# Patient Record
Sex: MALE | Race: WHITE | Employment: FULL TIME | URBAN - METROPOLITAN AREA
[De-identification: names, ages, dates, MRNs, and addresses within clinical notes are randomized per-mention and may not be internally consistent; named-entity substitution may affect disease eponyms.]

---

## 2021-04-05 ENCOUNTER — OFFICE VISIT (OUTPATIENT)
Dept: ORTHOPEDIC SURGERY | Age: 61
End: 2021-04-05
Payer: COMMERCIAL

## 2021-04-05 VITALS — RESPIRATION RATE: 14 BRPM | BODY MASS INDEX: 33.34 KG/M2 | HEIGHT: 68 IN | WEIGHT: 220 LBS

## 2021-04-05 DIAGNOSIS — M54.12 CERVICAL RADICULOPATHY: Primary | ICD-10-CM

## 2021-04-05 PROCEDURE — 99203 OFFICE O/P NEW LOW 30 MIN: CPT | Performed by: PHYSICIAN ASSISTANT

## 2021-04-05 PROCEDURE — 3017F COLORECTAL CA SCREEN DOC REV: CPT | Performed by: PHYSICIAN ASSISTANT

## 2021-04-05 PROCEDURE — 1036F TOBACCO NON-USER: CPT | Performed by: PHYSICIAN ASSISTANT

## 2021-04-05 PROCEDURE — G8417 CALC BMI ABV UP PARAM F/U: HCPCS | Performed by: PHYSICIAN ASSISTANT

## 2021-04-05 PROCEDURE — G8427 DOCREV CUR MEDS BY ELIG CLIN: HCPCS | Performed by: PHYSICIAN ASSISTANT

## 2021-04-05 RX ORDER — PREDNISONE 20 MG/1
20 TABLET ORAL 2 TIMES DAILY
Qty: 14 TABLET | Refills: 0 | Status: SHIPPED | OUTPATIENT
Start: 2021-04-05 | End: 2021-04-12

## 2021-04-05 ASSESSMENT — ENCOUNTER SYMPTOMS
GASTROINTESTINAL NEGATIVE: 1
RESPIRATORY NEGATIVE: 1
EYES NEGATIVE: 1

## 2021-04-05 NOTE — PROGRESS NOTES
I reviewed and agree with the portions of the HPI, review of systems, vital documentation and plan performed by my staff and have added/addended where appropriate. Review of Systems   Constitutional: Negative. HENT: Negative. Eyes: Negative. Respiratory: Negative. Cardiovascular: Negative. Gastrointestinal: Negative. Genitourinary: Negative. Musculoskeletal: Positive for arthralgias, myalgias, neck pain and neck stiffness. Skin: Negative. Neurological: Negative. Psychiatric/Behavioral: Negative. Kori Massey is a 62 y.o. male that is here today for his right shoulder pain. He states that approximately 2-3 months ago he shoved a cart with his shoulder while loading his truck and felt a sharp pain. Pain ic primarily located in the scapula and posterior shoulder and in the neck. . He has been seeing a chiropractor and a massage therapist which little relief. Pain level 10/10. He has tried ice, heat, Aleve, Tylenol which with no help. The chiropractor that he has seen in the past felt that it was likely his cervical spine but he ended up going to another chiropractor and that chiropractor suggested he come here to get his shoulder evaluated first before any further treatment. He states that pain is worse when he goes to extend his neck looking up towards the ceiling or when he brings his neck and chin down to his chest.  Pain sometimes does radiate down the right arm especially the posterior aspect of the arm. He does not notice any significant increase in pain with range of motion of the shoulder. He does not notice any weakness in the right shoulder. History reviewed. No pertinent past medical history. History reviewed. No pertinent surgical history. History reviewed. No pertinent family history.     Social History     Socioeconomic History    Marital status:      Spouse name: None    Number of children: None    Years of education: None    Highest education level: None   Occupational History    None   Social Needs    Financial resource strain: None    Food insecurity     Worry: None     Inability: None    Transportation needs     Medical: None     Non-medical: None   Tobacco Use    Smoking status: Former Smoker     Quit date: 9/16/2010     Years since quitting: 10.5    Smokeless tobacco: Never Used   Substance and Sexual Activity    Alcohol use: No    Drug use: No    Sexual activity: None   Lifestyle    Physical activity     Days per week: None     Minutes per session: None    Stress: None   Relationships    Social connections     Talks on phone: None     Gets together: None     Attends Islam service: None     Active member of club or organization: None     Attends meetings of clubs or organizations: None     Relationship status: None    Intimate partner violence     Fear of current or ex partner: None     Emotionally abused: None     Physically abused: None     Forced sexual activity: None   Other Topics Concern    None   Social History Narrative    None       Current Outpatient Medications   Medication Sig Dispense Refill    predniSONE (DELTASONE) 20 MG tablet Take 1 tablet by mouth 2 times daily for 7 days 14 tablet 0     No current facility-administered medications for this visit. No Known Allergies    Review of Systems:  See above      Physical Exam:   Resp 14   Ht 5' 8\" (1.727 m)   Wt 220 lb (99.8 kg)   BMI 33.45 kg/m²        Gait is Normal.     Gen/Psych:Examination reveals a pleasant individual in no acute distress. The patient is oriented to time, place and person. The patient's mood and affect are appropriate. Patient appears well nourished.  Body habitus is overweight    Head: Head is atraumatic normocephalic,  ears are symmetric,     Eyes: Eyes show equal pupils bilaterally, extraocular muscles intact    Ears:  Hearing is intact normal voice at 5 feet    Nose: Nares are patent bilaterally, no epistaxis,no rhinorrhea     Lymph:  No obvious lymphedema in bilateral upper extremities     Skin intact in bilateral upper extremities with no ulcerations, lesions, rash, erythema. Vascular: There are no varicosities in bilateral upper  extremities, sensation intact to light touch over bilateral upper extremities. Right shoulder exam:  Skin:  Clear with no erythema, there is no significant joint effusion  Deformity:  none  Atrophy:  none  Tenderness:  scapula, posterior cervical triangle, mild globally  Active ROM:   FE:180    IR side: L2           ER side: 50   Abduction: 180 degrees  Passive ROM is the same as active  Strength: FE:5/5     ER:5/5 IR:5/5      Elbow flexion: 5/5  [place]:  not positive  [place]:  not positive  [place]:not positive  [place]:  not positive        Cervical spine:  Tenderness to palpation along the medial scapular border of the right shoulder, tenderness over the mid line cervical spine. Range of motion of the cervical spine shows moderate reduction in extension and mild reduction in lateral bending to the right. No significant reduction and lateral rotation to the left or the right. Imaging studies:  4 views of the right shoulder taken and reviewed in the office today show no significant degenerative change in the glenohumeral joint, no degenerative changes in the acromioclavicular joint. There are no fractures or dislocations within the right shoulder. Incidental note of cervical degenerative changes noted. The official read and interpretation of these x-rays will be done by the the [facility]           Impression:    Diagnosis Orders   1. Cervical radiculopathy  MRI CERVICAL SPINE WO CONTRAST           Plan:    MRI of the cervical spine ordered to evaluate for cervical nerve compression based on patient's physical exam and history today I believe more of his pain is coming from his cervical spine.   I will see him back after the cervical MRI and depending on

## 2021-04-15 ENCOUNTER — HOSPITAL ENCOUNTER (OUTPATIENT)
Dept: MRI IMAGING | Age: 61
Discharge: HOME | End: 2021-04-15
Payer: COMMERCIAL

## 2021-04-15 ENCOUNTER — TELEPHONE (OUTPATIENT)
Dept: ORTHOPEDIC SURGERY | Age: 61
End: 2021-04-15

## 2021-04-15 DIAGNOSIS — M54.12 CERVICAL RADICULOPATHY: ICD-10-CM

## 2021-04-15 NOTE — TELEPHONE ENCOUNTER
Pt went to have MRI of neck and shoulder, he could not finish it, he had a panic attack. What would you like him to do now? ?  Stopped in the office.     Please advise  [phone]

## 2021-04-19 ENCOUNTER — OFFICE VISIT (OUTPATIENT)
Dept: ORTHOPEDIC SURGERY | Age: 61
End: 2021-04-19
Payer: COMMERCIAL

## 2021-04-19 VITALS
BODY MASS INDEX: 33.34 KG/M2 | RESPIRATION RATE: 16 BRPM | WEIGHT: 220 LBS | HEART RATE: 72 BPM | HEIGHT: 68 IN | OXYGEN SATURATION: 97 %

## 2021-04-19 DIAGNOSIS — M54.12 CERVICAL RADICULOPATHY: Primary | ICD-10-CM

## 2021-04-19 PROCEDURE — G8427 DOCREV CUR MEDS BY ELIG CLIN: HCPCS | Performed by: PHYSICIAN ASSISTANT

## 2021-04-19 PROCEDURE — 1036F TOBACCO NON-USER: CPT | Performed by: PHYSICIAN ASSISTANT

## 2021-04-19 PROCEDURE — G8417 CALC BMI ABV UP PARAM F/U: HCPCS | Performed by: PHYSICIAN ASSISTANT

## 2021-04-19 PROCEDURE — 3017F COLORECTAL CA SCREEN DOC REV: CPT | Performed by: PHYSICIAN ASSISTANT

## 2021-04-19 PROCEDURE — 99212 OFFICE O/P EST SF 10 MIN: CPT | Performed by: PHYSICIAN ASSISTANT

## 2021-04-19 RX ORDER — ESCITALOPRAM OXALATE 10 MG/1
TABLET ORAL
COMMUNITY
Start: 2021-04-16

## 2021-04-19 RX ORDER — HYDROCODONE BITARTRATE AND ACETAMINOPHEN 5; 325 MG/1; MG/1
1 TABLET ORAL 2 TIMES DAILY PRN
Qty: 6 TABLET | Refills: 0 | Status: SHIPPED | OUTPATIENT
Start: 2021-04-19 | End: 2021-04-22

## 2021-04-19 RX ORDER — CYCLOBENZAPRINE HCL 10 MG
TABLET ORAL
COMMUNITY
Start: 2021-04-16

## 2021-04-19 RX ORDER — LORAZEPAM 0.5 MG/1
0.5 TABLET ORAL ONCE
Qty: 1 TABLET | Refills: 0 | Status: CANCELLED | OUTPATIENT
Start: 2021-04-19 | End: 2021-04-19

## 2021-04-19 NOTE — PATIENT INSTRUCTIONS
Once MRI is completed bring a disc into the office. MRI of Cervical Spine  Follow-up in 2-3 weeks to go over MRI. Weightbearing as tolerated  Take pain medication prior to procedure. Make sure you have someone to drive you home after procedure.

## 2021-04-19 NOTE — TELEPHONE ENCOUNTER
Kelsey Josue PA-C  You 23 minutes ago (12:48 PM)     Ativan . 5mg   Take 45 minutes prior to procedure

## 2021-04-21 ENCOUNTER — TELEPHONE (OUTPATIENT)
Dept: ORTHOPEDIC SURGERY | Age: 61
End: 2021-04-21

## 2021-04-21 NOTE — TELEPHONE ENCOUNTER
Per Francine Case pt is to take pain medication to make the MRI tolerable. I called wife and informed her to use the pain medication as indicated to make the MRI tolerable.

## 2021-04-21 NOTE — TELEPHONE ENCOUNTER
Wife called stating that pt needs a medication for anxiety. The wife states that she just picked up the Rx for pain.      Pharmacy [facility]

## 2021-04-26 ENCOUNTER — TELEPHONE (OUTPATIENT)
Dept: ORTHOPEDIC SURGERY | Age: 61
End: 2021-04-26

## 2021-04-26 DIAGNOSIS — M25.511 RIGHT SHOULDER PAIN, UNSPECIFIED CHRONICITY: ICD-10-CM

## 2021-04-26 DIAGNOSIS — M54.12 CERVICAL RADICULOPATHY: Primary | ICD-10-CM

## 2021-04-26 NOTE — TELEPHONE ENCOUNTER
Pt went today for a open MRI of his neck/shoulder. We could not complete it, he about passed out. The tech said maybe he could have a CT.     Please advise   [phone]  Wife Elvia Griffiths

## 2021-04-29 NOTE — TELEPHONE ENCOUNTER
Patient is not capable of completing MRI secondary to pain medication from you and anxiety medication from PCP. Patients wife called on behalf of the patient to see what the next step should be. She reports the Prednisone you prescribed did not help his symptoms. Please advise.

## 2021-05-03 NOTE — TELEPHONE ENCOUNTER
Per Johny Morris  Patient is going to need follow up with PCP or follow up with Dr. Sacha Mares      9:50 Patient was called and message was left.  Patient needs to follow up with PCP or Dr. Sacha Mares

## 2021-05-13 ENCOUNTER — HOSPITAL ENCOUNTER (OUTPATIENT)
Age: 61
Discharge: HOME | End: 2021-05-13
Payer: COMMERCIAL

## 2021-05-13 ENCOUNTER — HOSPITAL ENCOUNTER (OUTPATIENT)
Dept: GENERAL RADIOLOGY | Age: 61
Discharge: HOME | End: 2021-05-13
Payer: COMMERCIAL

## 2021-05-13 DIAGNOSIS — M47.22 CERVICAL SPONDYLOSIS WITH RADICULOPATHY: ICD-10-CM

## 2021-05-13 PROCEDURE — 72040 X-RAY EXAM NECK SPINE 2-3 VW: CPT

## 2021-06-01 ENCOUNTER — HOSPITAL ENCOUNTER (OUTPATIENT)
Dept: PHYSICAL THERAPY | Age: 61
Setting detail: THERAPIES SERIES
Discharge: HOME | End: 2021-06-01
Payer: COMMERCIAL

## 2021-06-01 PROCEDURE — 97110 THERAPEUTIC EXERCISES: CPT

## 2021-06-01 PROCEDURE — 97140 MANUAL THERAPY 1/> REGIONS: CPT

## 2021-06-01 PROCEDURE — 97162 PT EVAL MOD COMPLEX 30 MIN: CPT

## 2021-06-01 ASSESSMENT — PAIN SCALES - GENERAL: PAINLEVEL_OUTOF10: 8

## 2021-06-01 NOTE — FLOWSHEET NOTE
Outpatient Physical Therapy  [place]           [x] Phone: [phone]   Fax: [phone]  [facility]           [] Phone: [phone]   Fax: [phone]        Physical Therapy Daily Treatment Note  Date:  2021    Patient Name:  Edi Fermin    :  1960  MRN: [id number]  Restrictions/Precautions:    Diagnosis:   Diagnosis: Cervical Radiculopathy  Date of Injury/Surgery:   Treatment Diagnosis: Treatment Diagnosis: Cervical Radiculopathy    Insurance/Certification information: PT Insurance Information: [facility]   Referring Physician:  Referring Practitioner: Dr. Orozco Given  Next Doctor Visit:    Plan of care signed (Y/N):    Outcome Measure:   Visit# / total visits:   /up to 10 initially  Pain level: 8/10   Goals:     Patient goals : decrease pain     Long term goals  Time Frame for Long term goals : 5 weeks  Long term goal 1: I in home program.  Long term goal 2: turn head, look over shoulders with minimal pain. Long term goal 3: look up with minimal pain. Long term goal 4: decrease UE, scapular symptoms by 75% or more. Summary of Evaluation: Assessment: Pt presents with complaints of neck, upper back, scapula, and R shoulder/arm symptoms onset ~4months ago when lifting/moving a large flower pot from an over head shelf hit him on the R superior shoulder/clavicle area. Most pain is R low cervical/upper back and medial border of scapula, with some pain at R arm. Symptoms worsen with cervical extension. He states MRI was attempted x2, but he could not complete due to pain and anxiety. Exam shows loss of cervical motion, forward head posture, intact dermatomes to lt touch, myotomes intact, + with R quadrant, R spurlings, no change with cervical distraction, + for relief with R UE abduction relief sign. Subjective:  See eval         Any changes in Ambulatory Summary Sheet?   None        Objective:  See eval   COVID screening questions were asked and patient attested that there had Cold/hotpack [] Iontophoresis   [x] Instruction in HEP      [] Vasopneumatic   [] Dry Needling    [x] Manual Therapy               [] Aquatic Therapy              Electronically signed by:  Tarun Choudhary PT, 6/2/2021, 7:44 AM

## 2021-06-01 NOTE — PLAN OF CARE
Outpatient Physical Therapy                  [x] Phone: [phone]   Fax: [phone]    Pediatric Therapy                                    [] Phone: [phone]   Fax: [phone]  Pediatric [facility]                                      [] Phone: [phone]   Fax: [phone]      To: Referring Practitioner: Dr. Danielle Osborne    From: Lazaro Queen, PT, PT     Patient: Amelia Bower       : 1960  Diagnosis: Cervical Radiculopathy       Date: 2021    Physical Therapy Certification/Re-Certification Form  Dear Dr. Danielle Osborne  The following patient has been evaluated for physical therapy services and for therapy to continue, Please review the attached evaluation and/or summary of the patient's plan of care, and verify that you agree therapy should continue by signing the attached document and sending it back to our office.   Patient is a  63 yo male who presents with cervical, R shoulder, upper back pain which impacts on adls;patient's goal is to decrease pain ;patient reports that pain  limits activities including turning head, sleep, looking up, sustained postures; PT to address patient's goals, impairments and activity limitations with skilled interventions checked in plan of care;patient's level of function prior to onset of pain was independent without pain limiting activities; did not observe any barriers to learning during PT eval; learning preferences include demonstration, practice, and handouts; patient expressed understanding of HEP; patient appears to be motivated to participate in an active PT program and to be compliant with HEP expectations;patient assisted in developing treatment plan and goals; no DME is currently being used;      Current functional level (based on NDI )   : 40%  Assessment:  Assessment: Pt presents with complaints of neck, upper back, scapula, and R shoulder/arm symptoms onset ~4months ago when lifting/moving a large flower pot from an over head shelf hit him on the R superior shoulder/clavicle area. Most pain is R low cervical/upper back and medial border of scapula, with some pain at R arm. Symptoms worsen with cervical extension. He states MRI was attempted x2, but he could not complete due to pain and anxiety. Exam shows loss of cervical motion, forward head posture, intact dermatomes to lt touch, myotomes intact, + with R quadrant, R spurlings, no change with cervical distraction, + for relief with R UE abduction relief sign. Plan of Care/Treatment to date:  [x] Therapeutic Exercise    [] Aquatics:  [x] Therapeutic Activity    [] Ultrasound  [] Elec Stimulation  [] Gait Training     [x] Cervical Traction [] Lumbar Traction  [x] Neuromuscular Re-education [] Cold/hotpack [] Iontophoresis   [x] Instruction in HEP       [x] Manual Therapy     [] vasopneumatic            [] Self care home management        []Dry needling trigger point point/pain management          Frequency/Duration:  # Days per week: [] 1 day # Weeks: [] 1 week [x] 5 weeks     [x] 2 days   [] 2 weeks [] 6 weeks     [] 3 days   [] 3 weeks [] 7 weeks     [] 4 days   [] 4 weeks [] 8 weeks    Rehab Potential/Progress: [] Excellent [x] Good [x] Fair  [] Poor     Goals:       Long term goals  Time Frame for Long term goals : 5 weeks  Long term goal 1: I in home program.  Long term goal 2: turn head, look over shoulders with minimal pain. Long term goal 3: look up with minimal pain. Long term goal 4: decrease UE, scapular symptoms by 75% or more. Electronically signed by:  Rosio Dawkins PT, PT, 6/1/2021, 5:59 PM              If you have any questions or concerns, please don't hesitate to call.   Thank you for your referral.      Physician Signature:_________________Date:____________Time: ________  By signing above, therapists plan is approved by physician

## 2021-06-01 NOTE — PROGRESS NOTES
Physical Therapy  Initial Assessment  Date: 2021  Patient Name: Rabia Piper  MRN: [id number]  : 1960     Treatment Diagnosis: Cervical Radiculopathy    Restrictions       Subjective   General  Additional Pertinent Hx: Neck and RUE pain, 4months onset when lifting shelving, and also a pot fell and landed on R collarbone. Referring Practitioner: Dr. Cruz Patient  Referral Date : 21  Diagnosis: Cervical Radiculopathy  PT Visit Information  PT Insurance Information: [facility]  Subjective  Subjective: R neck, upper back, scap pain. sharp pains at shoulder. 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 Observation: forward head    AROM RUE (degrees)  RUE AROM : WFL  AROM LUE (degrees)  LUE AROM : WFL  Spine  Cervical: 45 flex, ext (pain w ext, to R medial scap), 20 RSB, 35 LSB, Rrot with mild tightness. B rotation WFLs. Special Tests: + R quadrant, pain R spurlings. , no relief with distraction, relief with R hand on scap    Strength RUE  Comment: myotomes intact  Strength LUE  Comment: myotomes intact  Strength Other  Other: mild pain with shoulder impingement signs, neg for hug, neg AC shear.         Sensation  Overall Sensation Status:  (dermatomes intact to lt touch BUE)               Assessment   Conditions Requiring Skilled Therapeutic Intervention  Assessment: Pt presents with complaints of neck, upper back, scapula, and R shoulder/arm symptoms onset ~4months ago when lifting/moving a large flower pot from an over head shelf hit him on the R superior shoulder/clavicle area. Most pain is R low cervical/upper back and medial border of scapula, with some pain at R arm. Symptoms worsen with cervical extension. He states MRI was attempted x2, but he could not complete due to pain and anxiety. Exam shows loss of cervical motion, forward head posture, intact dermatomes to lt touch, myotomes intact, + with R quadrant, R spurlings, no change with cervical distraction, + for relief with R UE abduction relief sign. Treatment Diagnosis: Cervical Radiculopathy  Prognosis: Good;Fair         Goals  Long term goals  Time Frame for Long term goals : 5 weeks  Long term goal 1: I in home program.  Long term goal 2: turn head, look over shoulders with minimal pain. Long term goal 3: look up with minimal pain. Long term goal 4: decrease UE, scapular symptoms by 75% or more.   Patient Goals   Patient goals : decrease pain          Jordyn Zheng PT

## 2021-06-09 ENCOUNTER — HOSPITAL ENCOUNTER (OUTPATIENT)
Dept: PHYSICAL THERAPY | Age: 61
Setting detail: THERAPIES SERIES
Discharge: HOME | End: 2021-06-09
Payer: COMMERCIAL

## 2021-06-09 PROCEDURE — 97110 THERAPEUTIC EXERCISES: CPT

## 2021-06-09 PROCEDURE — 97140 MANUAL THERAPY 1/> REGIONS: CPT

## 2021-06-09 NOTE — FLOWSHEET NOTE
Outpatient Physical Therapy  [place]           [x] Phone: [phone]   Fax: [phone]  [place]           [] Phone: [phone]   Fax: [phone]        Physical Therapy Daily Treatment Note  Date:  2021    Patient Name:  Tobias Fothergill    :  1960  MRN: [id number]  Restrictions/Precautions:    Diagnosis:   Diagnosis: Cervical Radiculopathy  Date of Injury/Surgery:   Treatment Diagnosis: Treatment Diagnosis: Cervical Radiculopathy    Insurance/Certification information: PT Insurance Information: [facility]   Referring Physician:  Referring Practitioner: Dr. Roni Hitchcock  Next Doctor Visit:    Plan of care signed (Y/N):    Outcome Measure:   Visit# / total visits:  2 /up to 10 initially  Pain level: 4/10   Goals:     Patient goals : decrease pain     Long term goals  Time Frame for Long term goals : 5 weeks  Long term goal 1: I in home program.  Long term goal 2: turn head, look over shoulders with minimal pain. Long term goal 3: look up with minimal pain. Long term goal 4: decrease UE, scapular symptoms by 75% or more. Summary of Evaluation: Assessment: Pt presents with complaints of neck, upper back, scapula, and R shoulder/arm symptoms onset ~4months ago when lifting/moving a large flower pot from an over head shelf hit him on the R superior shoulder/clavicle area. Most pain is R low cervical/upper back and medial border of scapula, with some pain at R arm. Symptoms worsen with cervical extension. He states MRI was attempted x2, but he could not complete due to pain and anxiety. Exam shows loss of cervical motion, forward head posture, intact dermatomes to lt touch, myotomes intact, + with R quadrant, R spurlings, no change with cervical distraction, + for relief with R UE abduction relief sign. Subjective:  Pt relates some sticking pain at R upper back and shoulder. Better overall. Any changes in Ambulatory Summary Sheet?   None        Objective:    COVID screening questions were asked and patient attested that there had been no contact or symptoms    Pt has difficulty relaxing. Increased tone at upper trap, levator. cervical paraspinals. BSB 35 degs    Exercises: (No more than 4 columns)   Exercise/Equipment Date 6/1/21 Date 6/9/21  Date           WARM UP                     TABLE      Upper back stretch 20 sec x 2 review    Scalene stretch 20 sex x2 review    Levator stretch RUE reach to scap 20 sec x 3 review    Chin tuck               STANDING      pec stretch  Corner 20 sec x 3    Rows  Blue 3x10    Habd                                   PROPRIOCEPTION                                    MODALITIES      May trial mech traction if manual is tolerated                Other Therapeutic Activities/Education:        Home Exercise Program:  Upper back, levator, scalene stretch  Rows, corner stretch    Manual Treatments:  Prolonged manual cervical traction with OA release, TPR/MFR to Upper trap, levator, scalenes, pectorals, rhomboids. Modalities:        Communication with other providers:        Assessment:  (Response towards treatment session) (Pain Rating)  Decreased pain overall, improved cervical motion. May need to review home program again next visit. Pt would benefit from additional therapy to improve posture, decrease symptoms and improve functions.           Plan for Next Session:        Time In / Time Out:    1658/1740           Timed Code/Total Treatment Minutes:  42/42      Next Progress Note due:        Plan of Care Interventions:  [x] Therapeutic Exercise  [] Modalities:  [x] Therapeutic Activity     [] Ultrasound  [] Estim  [] Gait Training      [] Cervical Traction [] Lumbar Traction  [x] Neuromuscular Re-education    [] Cold/hotpack [] Iontophoresis   [x] Instruction in HEP      [] Vasopneumatic   [] Dry Needling    [x] Manual Therapy               [] Aquatic Therapy              Electronically signed by:  Nicole Atwood PT, 6/9/2021, 4:57 PM

## 2021-06-11 ENCOUNTER — HOSPITAL ENCOUNTER (OUTPATIENT)
Dept: PHYSICAL THERAPY | Age: 61
Setting detail: THERAPIES SERIES
Discharge: HOME | End: 2021-06-11
Payer: COMMERCIAL

## 2021-06-11 PROCEDURE — 97140 MANUAL THERAPY 1/> REGIONS: CPT

## 2021-06-11 PROCEDURE — 97110 THERAPEUTIC EXERCISES: CPT

## 2021-06-11 NOTE — FLOWSHEET NOTE
Outpatient Physical Therapy  [place]           [x] Phone: [phone]   Fax: [phone]  [place]           [] Phone: [phone]   Fax: [phone]        Physical Therapy Daily Treatment Note  Date:  2021    Patient Name:  Juan Reynaga    :  1960  MRN: [id number]  Restrictions/Precautions:    Diagnosis:   Diagnosis: Cervical Radiculopathy  Date of Injury/Surgery:   Treatment Diagnosis: Treatment Diagnosis: Cervical Radiculopathy    Insurance/Certification information: PT Insurance Information: [facility]   Referring Physician:  Referring Practitioner: Dr. Marianela Garsia  Next Doctor Visit:    Plan of care signed (Y/N):    Outcome Measure:   Visit# / total visits:  3 /up to 10 initially  Pain level: 5/10    Goals:     Patient goals : decrease pain     Long term goals  Time Frame for Long term goals : 5 weeks  Long term goal 1: I in home program.  Long term goal 2: turn head, look over shoulders with minimal pain. Long term goal 3: look up with minimal pain. Long term goal 4: decrease UE, scapular symptoms by 75% or more. Summary of Evaluation: Assessment: Pt presents with complaints of neck, upper back, scapula, and R shoulder/arm symptoms onset ~4months ago when lifting/moving a large flower pot from an over head shelf hit him on the R superior shoulder/clavicle area. Most pain is R low cervical/upper back and medial border of scapula, with some pain at R arm. Symptoms worsen with cervical extension. He states MRI was attempted x2, but he could not complete due to pain and anxiety. Exam shows loss of cervical motion, forward head posture, intact dermatomes to lt touch, myotomes intact, + with R quadrant, R spurlings, no change with cervical distraction, + for relief with R UE abduction relief sign. Subjective:   Pt arrives to tx session reporting 5/10 and in good spirits. Any changes in Ambulatory Summary Sheet?   None        Objective:    COVID screening questions were asked and patient attested that there had been no contact or symptoms    Pt has difficulty relaxing. Increased tone at upper trap, levator. cervical paraspinals. Pt C/O muscle knot pain lateral R scap area. Responds very well to manual TPR. Exercises: (No more than 4 columns)   Exercise/Equipment Date 6/1/21 Date 6/9/21  Date  6/11/21 #3           WARM UP                     TABLE      Upper back stretch 20 sec x 2 review review   Scalene stretch 20 sex x2 review review   Levator stretch RUE reach to scap 20 sec x 3 review review   Chin tuck   x10            STANDING      pec stretch  Corner 20 sec x 3 Corner 20 sec x 3   ext   Blue 3x10   Rows  Blue 3x10 Blue 3x10   Habd                                   PROPRIOCEPTION                                    MODALITIES      May trial mech traction if manual is tolerated                Other Therapeutic Activities/Education:        Home Exercise Program:  Upper back, levator, scalene stretch  Rows, corner stretch    Manual Treatments:  Prolonged manual cervical traction with OA release, TPR/MFR to Upper trap, levator, scalenes, pectorals, rhomboids. Modalities:        Communication with other providers:        Assessment:  Pt tolerates session well today without adverse effects. Reports pain decrease to 2/10 after session. Pt would benefit from additional therapy to improve posture, decrease symptoms and improve functions.           Plan for Next Session:        Time In / Time Out:     1871 / 4311           Timed Code/Total Treatment Minutes:  36' / 36'    2 man    1 TE      Next Progress Note due:        Plan of Care Interventions:  [x] Therapeutic Exercise  [] Modalities:  [x] Therapeutic Activity     [] Ultrasound  [] Estim  [] Gait Training      [] Cervical Traction [] Lumbar Traction  [x] Neuromuscular Re-education    [] Cold/hotpack [] Iontophoresis   [x] Instruction in HEP      [] Vasopneumatic   [] Dry Needling    [x] Manual Therapy               [] Aquatic Therapy              Electronically signed by:  Marvin Connors PTA, 6/11/2021, 1:28 PM

## 2021-06-14 ENCOUNTER — HOSPITAL ENCOUNTER (OUTPATIENT)
Dept: PHYSICAL THERAPY | Age: 61
Setting detail: THERAPIES SERIES
Discharge: HOME | End: 2021-06-14
Payer: COMMERCIAL

## 2021-06-14 PROCEDURE — 97110 THERAPEUTIC EXERCISES: CPT

## 2021-06-14 PROCEDURE — 97140 MANUAL THERAPY 1/> REGIONS: CPT

## 2021-06-14 NOTE — FLOWSHEET NOTE
Outpatient Physical Therapy  [place]           [x] Phone: [phone]   Fax: [phone]  [place]           [] Phone: [phone]   Fax: [phone]        Physical Therapy Daily Treatment Note  Date:  2021    Patient Name:  Cheri [place]    :  1960  MRN: [id number]  Restrictions/Precautions:    Diagnosis:   Diagnosis: Cervical Radiculopathy  Date of Injury/Surgery:   Treatment Diagnosis: Treatment Diagnosis: Cervical Radiculopathy    Insurance/Certification information: PT Insurance Information: [facility]   Referring Physician:  Referring Practitioner: Dr. Syliva Kawasaki  Next Doctor Visit:    Plan of care signed (Y/N):     Outcome Measure:   Visit# / total visits:  4 /up to 10 initially  Pain level: 310    Goals:     Patient goals : decrease pain     Long term goals  Time Frame for Long term goals : 5 weeks  Long term goal 1: I in home program.  Long term goal 2: turn head, look over shoulders with minimal pain. Long term goal 3: look up with minimal pain. Long term goal 4: decrease UE, scapular symptoms by 75% or more. Summary of Evaluation: Assessment: Pt presents with complaints of neck, upper back, scapula, and R shoulder/arm symptoms onset ~4months ago when lifting/moving a large flower pot from an over head shelf hit him on the R superior shoulder/clavicle area. Most pain is R low cervical/upper back and medial border of scapula, with some pain at R arm. Symptoms worsen with cervical extension. He states MRI was attempted x2, but he could not complete due to pain and anxiety. Exam shows loss of cervical motion, forward head posture, intact dermatomes to lt touch, myotomes intact, + with R quadrant, R spurlings, no change with cervical distraction, + for relief with R UE abduction relief sign. Subjective:   Pt arrives to tx session reporting 3/10 and in good spirits. Any changes in Ambulatory Summary Sheet?   None        Objective:    COVID screening questions were asked and patient attested that there had been no contact or symptoms    Pt has difficulty relaxing. Increased tone at upper trap, levator. cervical paraspinals. Pt C/O muscle knot pain lateral R scap area. Responds very well to manual TPR. Pt continues with poor posture; educated again on importance of good posture. Exercises: (No more than 4 columns)   Exercise/Equipment Date 6/9/21  Date  6/11/21 #3 6/14/21 #4           WARM UP                     TABLE      Upper back stretch review review    Scalene stretch review review    Levator stretch review review    Chin tuck  x10 10x2            STANDING      pec stretch Corner 20 sec x 3 Corner 20 sec x 3 Corner 20 sec x 3   ext  Blue 3x10 Blue 3x10   Rows Blue 3x10 Blue 3x10 Blue 3x10   Habd      Pal-off press   2x10 Blue TB                          PROPRIOCEPTION                                    MODALITIES      May trial mech traction if manual is tolerated                Other Therapeutic Activities/Education:        Home Exercise Program:  Upper back, levator, scalene stretch  Rows, corner stretch    Manual Treatments:  Prolonged manual cervical traction with OA release, TPR/MFR to Upper trap, levator, scalenes, pectorals, rhomboids. Modalities:        Communication with other providers:        Assessment:  Pt tolerates session well today without adverse effects. Reports pain decrease to 1/10 after session. Pt would benefit from additional therapy to improve posture, decrease symptoms and improve functions.           Plan for Next Session:        Time In / Time Out:     7661 / 2539           Timed Code/Total Treatment Minutes:  37' / 37'    2 man    1 TE      Next Progress Note due:        Plan of Care Interventions:  [x] Therapeutic Exercise  [] Modalities:  [x] Therapeutic Activity     [] Ultrasound  [] Estim  [] Gait Training      [] Cervical Traction [] Lumbar Traction  [x] Neuromuscular Re-education    [] Cold/hotpack [] Iontophoresis   [x] Instruction in HEP      [] Vasopneumatic   [] Dry Needling    [x] Manual Therapy               [] Aquatic Therapy              Electronically signed by:  Merle Aparicio PTA, 6/14/2021, 4:50 PM

## 2021-06-16 ENCOUNTER — HOSPITAL ENCOUNTER (OUTPATIENT)
Dept: PHYSICAL THERAPY | Age: 61
Setting detail: THERAPIES SERIES
Discharge: HOME | End: 2021-06-16
Payer: COMMERCIAL

## 2021-06-16 PROCEDURE — 97140 MANUAL THERAPY 1/> REGIONS: CPT

## 2021-06-16 PROCEDURE — 97110 THERAPEUTIC EXERCISES: CPT

## 2021-06-16 NOTE — FLOWSHEET NOTE
Outpatient Physical Therapy  [place]           [x] Phone: [phone]   Fax: [phone]  [place] [facility]           [] Phone: [phone]   Fax: [phone]        Physical Therapy Daily Treatment Note  Date:  2021    Patient Name:  Rudy Hu    :  1960  MRN: [id number]  Restrictions/Precautions:    Diagnosis:   Diagnosis: Cervical Radiculopathy  Date of Injury/Surgery:   Treatment Diagnosis: Treatment Diagnosis: Cervical Radiculopathy    Insurance/Certification information: PT Insurance Information: [facility]   Referring Physician:  Referring Practitioner: Dr. Félix Cadet  Next Doctor Visit:    Plan of care signed (Y/N):     Outcome Measure:   Visit# / total visits:  4 /up to 10 initially??????? Pain level: 3-4/10        Goals:     Patient goals : decrease pain     Long term goals  Time Frame for Long term goals : 5 weeks  Long term goal 1: I in home program.  Long term goal 2: turn head, look over shoulders with minimal pain. Long term goal 3: look up with minimal pain. Long term goal 4: decrease UE, scapular symptoms by 75% or more. Summary of Evaluation: Assessment: Pt presents with complaints of neck, upper back, scapula, and R shoulder/arm symptoms onset ~4months ago when lifting/moving a large flower pot from an over head shelf hit him on the R superior shoulder/clavicle area. Most pain is R low cervical/upper back and medial border of scapula, with some pain at R arm. Symptoms worsen with cervical extension. He states MRI was attempted x2, but he could not complete due to pain and anxiety. Exam shows loss of cervical motion, forward head posture, intact dermatomes to lt touch, myotomes intact, + with R quadrant, R spurlings, no change with cervical distraction, + for relief with R UE abduction relief sign. Subjective:     Pt states that he can tell therapy has been decreasing his pain consistently.  Presented with 3-4/10 sharp biting pain around acromion process and posterior delt. Any changes in Ambulatory Summary Sheet? None        Objective:    COVID screening questions were asked and patient attested that there had been no contact or symptoms    Pt showed increase of strength and endurance compared to last session. Exercises: (No more than 4 columns)   Exercise/Equipment Date  6/11/21 #3 6/14/21 #4 6/16/21 #5           WARM UP                     TABLE      Upper back stretch review  Seated 2 set* 30\"   Scalene stretch review  manual   Levator stretch review  Manual    Chin tuck x10 10x2 10 sets*3\"            STANDING      pec stretch Corner 20 sec x 3 Corner 20 sec x 3 Doorway 3 sets x 30\"   ext Blue 3x10 Blue 3x10 Black 2 x 20   Rows Blue 3x10 Blue 3x10 Blue 2 x 10   H abd      Pal-off press  2x10 Blue TB 2x10 Blue TB ea                          PROPRIOCEPTION                                    MODALITIES      May trial mech traction if manual is tolerated                Other Therapeutic Activities/Education:        Home Exercise Program:  Upper back, levator, scalene stretch  Rows, corner stretch    Manual Treatments:  Manual cervical, suboccipital release, STM/ TPR/ MFR to Upper trap, levator, scalenes, rhomboids, supraspinatus and infraspinatus. Modalities:  none      Communication with other providers:  None       Assessment:     Pt responded well to manual treatments and therex. Pt shows forward shoulder posture due to tight pectorals, likely causing him pain in other movements. Pt's posture was more relaxed after doorway stretch. Pt stated the pain was barely noticeable by the end of session. End level pain: 1-2/10    Plan for Next Session:     Pt will continue to benefit from stretches and strengthening to improve posture and decrease pain.     Time In / Time Out:     2945-1955         Timed Code/Total Treatment Minutes:  55/48    2 man  44'  1 TE 10'      Next Progress Note due:        Plan of Care Interventions:  [x] Therapeutic Exercise  [] Modalities:  [x] Therapeutic Activity     [] Ultrasound  [] Estim  [] Gait Training      [] Cervical Traction [] Lumbar Traction  [x] Neuromuscular Re-education    [] Cold/hotpack [] Iontophoresis   [x] Instruction in HEP      [] Vasopneumatic   [] Dry Needling    [x] Manual Therapy               [] Aquatic Therapy              Electronically signed by:  Aimee Justin 6/16/2021, 4:40 PM        Estuardo Linton.  Ozzy Mendoza

## 2021-06-21 ENCOUNTER — HOSPITAL ENCOUNTER (OUTPATIENT)
Dept: PHYSICAL THERAPY | Age: 61
Setting detail: THERAPIES SERIES
Discharge: HOME | End: 2021-06-21
Payer: COMMERCIAL

## 2021-06-21 PROCEDURE — 97110 THERAPEUTIC EXERCISES: CPT

## 2021-06-21 PROCEDURE — 97140 MANUAL THERAPY 1/> REGIONS: CPT

## 2021-06-29 ENCOUNTER — HOSPITAL ENCOUNTER (OUTPATIENT)
Dept: PHYSICAL THERAPY | Age: 61
Setting detail: THERAPIES SERIES
Discharge: HOME | End: 2021-06-29
Payer: COMMERCIAL

## 2021-06-29 PROCEDURE — 97140 MANUAL THERAPY 1/> REGIONS: CPT

## 2021-06-29 PROCEDURE — 97110 THERAPEUTIC EXERCISES: CPT

## 2021-06-29 NOTE — FLOWSHEET NOTE
Outpatient Physical Therapy  [place]           [x] Phone: [phone]   Fax: [phone]  [place]           [] Phone: [phone]   Fax: [phone]        Physical Therapy Daily Treatment Note  Date:  2021    Patient Name:  Spenser Arredondo    :  1960  MRN: [id number]  Restrictions/Precautions:    Diagnosis:   Diagnosis: Cervical Radiculopathy  Date of Injury/Surgery:   Treatment Diagnosis: Treatment Diagnosis: Cervical Radiculopathy    Insurance/Certification information: PT Insurance Information: [facility]   Referring Physician:  Referring Practitioner: Dr. Olivia Mendiola  Next Doctor Visit:    Plan of care signed (Y/N):     Outcome Measure:   Visit# / total visits:  6 /up to 10 initially  Pain level: 0/10        Goals:     Patient goals : decrease pain     Long term goals  Time Frame for Long term goals : 5 weeks  Long term goal 1: I in home program.       MET  Long term goal 2: turn head, look over shoulders with minimal pain. MET  Long term goal 3: look up with minimal pain. MET  Long term goal 4: decrease UE, scapular symptoms by 75% or more. MET    Summary of Evaluation: Assessment: Pt presents with complaints of neck, upper back, scapula, and R shoulder/arm symptoms onset ~4months ago when lifting/moving a large flower pot from an over head shelf hit him on the R superior shoulder/clavicle area. Most pain is R low cervical/upper back and medial border of scapula, with some pain at R arm. Symptoms worsen with cervical extension. He states MRI was attempted x2, but he could not complete due to pain and anxiety. Exam shows loss of cervical motion, forward head posture, intact dermatomes to lt touch, myotomes intact, + with R quadrant, R spurlings, no change with cervical distraction, + for relief with R UE abduction relief sign. Subjective:   Pt arrives to tx session reporting 0/10 pain. No UE symptoms. No neck pain. Feels great.       Any changes in Ambulatory Summary Sheet? None        Objective:    COVID screening questions were asked and patient attested that there had been no contact or symptoms      Exercises: (No more than 4 columns)   Exercise/Equipment 6/14/21 #4 6/16/21 #5 6/21/21  #6 6/29/21 #7            WARM UP                        TABLE       Upper back stretch  Seated 2 set* 30\" Seated 2 set* 30\"    Scalene stretch  manual manual Review, and performed manually   Levator stretch  Manual  manual Review, and performed manually   Chin tuck 10x2 10 sets*3\" 10 sets*3\" Review, and performed manually             STANDING       pec stretch Corner 20 sec x 3 Doorway 3 sets x 30\" Doorway 3 sets x 30\" Review, and performed manually   ext Blue 3x10 Black 2 x 20 Black 2 x 20    Rows Blue 3x10 Blue 2 x 10 Black 2 x 20    H abd       Pal-off press 2x10 Blue TB 2x10 Blue TB ea 2x10 Blue TB ea                              PROPRIOCEPTION                                          MODALITIES       May trial mech traction if manual is tolerated                  Other Therapeutic Activities/Education:        Home Exercise Program:  Upper back, levator, scalene stretch  Rows, corner stretch  Black band for shld ext, rows, pallof press    Manual Treatments:  Manual cervical, suboccipital release, STM/ TPR/ MFR to Upper trap, levator, scalenes, rhomboids, supraspinatus and infraspinatus. Modalities:  none      Communication with other providers:  None       Assessment:     Pt responded well to manual treatments and therex. 0/10 pain after treatment.   Goals met, NDI 0      Plan :  D/C      Time In / Time Out:     1540/1610         Timed Code/Total Treatment Minutes:  30/30      Next Progress Note due:        Plan of Care Interventions:  [x] Therapeutic Exercise  [] Modalities:  [x] Therapeutic Activity     [] Ultrasound  [] Estim  [] Gait Training      [] Cervical Traction [] Lumbar Traction  [x] Neuromuscular Re-education    [] Cold/hotpack [] Iontophoresis   [x] Instruction in HEP [] Vasopneumatic   [] Dry Needling    [x] Manual Therapy               [] Aquatic Therapy              Electronically signed by:  Arthur Goodwin, PT   6/29/2021, 3:50 PM

## 2021-06-30 NOTE — PROGRESS NOTES
Outpatient Physical Therapy        [x] Phone: [phone]   Fax: [phone]   Physician:  Dr. Alexis Garcia     From: Doris Dupree PT, PT     Patient: Guillermo Francsi                  : 1960  Diagnosis: Cervical Radiculopathy      Date: 2021  Treatment Diagnosis:  same    []  Progress Note                [x]  Discharge Note    Total Visits to date:  6  Cancels/No-shows to date:  0  Subjective:   Pt arrives to tx session reporting 0/10 pain. No UE symptoms. No neck pain. Feels great.         Plan of Care/Treatment to date:  [x] Therapeutic Exercise    [] Modalities:  [x] Therapeutic Activity     [] Ultrasound  [] Electric Stimulation  [] Gait Training      [] Cervical Traction    [] Lumbar Traction  [x] Neuromuscular Re-education  [] Cold/hotpack [] Iontophoresis  [x] Instruction in HEP      Other:  [x] Manual Therapy       []  Vasopneumatic  [] Self care management                           [] Dry needling trigger point/pain management                ? Objective/Significant Findings At Last Visit/Comments:    Long term goal 1: I in home program.                                                                       MET  Long term goal 2: turn head, look over shoulders with minimal pain. MET  Long term goal 3: look up with minimal pain. MET  Long term goal 4: decrease UE, scapular symptoms by 75% or more. MET     NDI 0% (was 40% at eval)      Patient Status: [] Continue per initial plan of Care     [x] Patient now discharged     [] Additional visits requested, Please re-certify for additional visits:    Electronically signed by:  Doris Dupree PT, PT, 2021, 7:25 AM    If you have any questions or concerns, please don't hesitate to call.   Thank you for your referral.

## 2021-07-21 ASSESSMENT — ENCOUNTER SYMPTOMS
RESPIRATORY NEGATIVE: 1
GASTROINTESTINAL NEGATIVE: 1
EYES NEGATIVE: 1

## 2021-07-21 NOTE — PROGRESS NOTES
I reviewed and agree with the portions of the HPI, review of systems, vital documentation and plan performed by my staff and have added/addended where appropriate. Review of Systems   Constitutional: Negative. HENT: Negative. Eyes: Negative. Respiratory: Negative. Cardiovascular: Negative. Gastrointestinal: Negative. Genitourinary: Negative. Musculoskeletal: Positive for arthralgias, myalgias, neck pain and neck stiffness. Skin: Negative. Neurological: Negative. Psychiatric/Behavioral: Negative. Amy Francis is a 62 y.o. male that returns the office today with continued right shoulder and neck pain. He has not yet gone to get his MRI of his cervical spine. No past medical history on file. No past surgical history on file. No family history on file. Social History     Socioeconomic History    Marital status:      Spouse name: None    Number of children: None    Years of education: None    Highest education level: None   Occupational History    None   Tobacco Use    Smoking status: Former Smoker     Quit date: 9/16/2010     Years since quitting: 10.8    Smokeless tobacco: Never Used   Substance and Sexual Activity    Alcohol use: No    Drug use: No    Sexual activity: None   Other Topics Concern    None   Social History Narrative    None     Social Determinants of Health     Financial Resource Strain:     Difficulty of Paying Living Expenses:    Food Insecurity:     Worried About Running Out of Food in the Last Year:     [address] in the Last Year:    Transportation Needs:     Lack of Transportation (Medical):      Lack of Transportation (Non-Medical):    Physical Activity:     Days of Exercise per Week:     Minutes of Exercise per Session:    Stress:     Feeling of Stress :    Social Connections:     Frequency of Communication with Friends and Family:     Frequency of Social Gatherings with Friends and Family:     Attends [employer] Services:     Active Member of Clubs or Organizations:     Attends Club or Organization Meetings:     Marital Status:    Intimate Partner Violence:     Fear of Current or Ex-Partner:     Emotionally Abused:     Physically Abused:     Sexually Abused:        Current Outpatient Medications   Medication Sig Dispense Refill    cyclobenzaprine (FLEXERIL) 10 MG tablet       escitalopram (LEXAPRO) 10 MG tablet        No current facility-administered medications for this visit. No Known Allergies    Review of Systems:  See above      Physical Exam:   Pulse 72   Resp 16   Ht 5' 8\" (1.727 m)   Wt 220 lb (99.8 kg)   SpO2 97%   BMI 33.45 kg/m²        Gait is Normal.     Gen/Psych:Examination reveals a pleasant individual in no acute distress. The patient is oriented to time, place and person. The patient's mood and affect are appropriate. Patient appears well nourished. Body habitus is overweight    Head: Head is atraumatic normocephalic,  ears are symmetric,     Eyes: Eyes show equal pupils bilaterally, extraocular muscles intact    Ears:  Hearing is intact normal voice at 5 feet    Nose: Nares are patent bilaterally, no epistaxis,no rhinorrhea     Lymph:  No obvious lymphedema in bilateral upper extremities     Skin intact in bilateral upper extremities with no ulcerations, lesions, rash, erythema. Vascular: There are no varicosities in bilateral upper  extremities, sensation intact to light touch over bilateral upper extremities.       Right shoulder exam:  Skin:  Clear with no erythema, there is no significant joint effusion  Deformity:  none  Atrophy:  none  Tenderness:  scapula, posterior cervical triangle, mild globally  Active ROM:   FE:180    IR side: L2           ER side: 50   Abduction: 180 degrees  Passive ROM is the same as active  Strength: FE:5/5     ER:5/5 IR:5/5      Elbow flexion: 5/5  Neer:  not positive  Sauceda:  not positive  Yergason:not positive  Neola:  not positive        Cervical spine:  Tenderness to palpation along the medial scapular border of the right shoulder, tenderness over the mid line cervical spine. Range of motion of the cervical spine shows moderate reduction in extension and mild reduction in lateral bending to the right. No significant reduction and lateral rotation to the left or the right. Impression:    Diagnosis Orders   1. Cervical radiculopathy  HYDROcodone-acetaminophen (NORCO) 5-325 MG per tablet           Plan:    Patient Instructions   Once MRI is completed bring a disc into the office. MRI of Cervical Spine  Follow-up in 2-3 weeks to go over MRI. Weightbearing as tolerated  Take pain medication prior to procedure. Make sure you have someone to drive you home after procedure.

## 2025-06-25 NOTE — FLOWSHEET NOTE
Outpatient Physical Therapy  [place]           [x] Phone: [phone]   Fax: [phone]  [place]           [] Phone: [phone]   Fax: [phone]        Physical Therapy Daily Treatment Note  Date:  2021    Patient Name:  Destin Perez    :  1960  MRN: [id number]  Restrictions/Precautions:    Diagnosis:   Diagnosis: Cervical Radiculopathy  Date of Injury/Surgery:   Treatment Diagnosis: Treatment Diagnosis: Cervical Radiculopathy    Insurance/Certification information: PT Insurance Information: [facility]   Referring Physician:  Referring Practitioner: Dr. Ligia Turcios  Next Doctor Visit:    Plan of care signed (Y/N):     Outcome Measure:   Visit# / total visits:  5 /up to 10 initially??????? Pain level: 3/10        Goals:     Patient goals : decrease pain     Long term goals  Time Frame for Long term goals : 5 weeks  Long term goal 1: I in home program.  Long term goal 2: turn head, look over shoulders with minimal pain. Long term goal 3: look up with minimal pain. Long term goal 4: decrease UE, scapular symptoms by 75% or more. Summary of Evaluation: Assessment: Pt presents with complaints of neck, upper back, scapula, and R shoulder/arm symptoms onset ~4months ago when lifting/moving a large flower pot from an over head shelf hit him on the R superior shoulder/clavicle area. Most pain is R low cervical/upper back and medial border of scapula, with some pain at R arm. Symptoms worsen with cervical extension. He states MRI was attempted x2, but he could not complete due to pain and anxiety. Exam shows loss of cervical motion, forward head posture, intact dermatomes to lt touch, myotomes intact, + with R quadrant, R spurlings, no change with cervical distraction, + for relief with R UE abduction relief sign. Subjective:   Pt arrives to tx session reporting 3/10 pain. Any changes in Ambulatory Summary Sheet?   None        Objective:    COVID screening questions were asked and patient attested that there had been no contact or symptoms      Exercises: (No more than 4 columns)   Exercise/Equipment 6/14/21 #4 6/16/21 #5 6/21/21  #6           WARM UP                     TABLE      Upper back stretch  Seated 2 set* 30\" Seated 2 set* 30\"   Scalene stretch  manual manual   Levator stretch  Manual  manual   Chin tuck 10x2 10 sets*3\" 10 sets*3\"            STANDING      pec stretch Corner 20 sec x 3 Doorway 3 sets x 30\" Doorway 3 sets x 30\"   ext Blue 3x10 Black 2 x 20 Black 2 x 20   Rows Blue 3x10 Blue 2 x 10 Black 2 x 20   H abd      Pal-off press 2x10 Blue TB 2x10 Blue TB ea 2x10 Blue TB ea                          PROPRIOCEPTION                                    MODALITIES      May trial mech traction if manual is tolerated                Other Therapeutic Activities/Education:        Home Exercise Program:  Upper back, levator, scalene stretch  Rows, corner stretch    Manual Treatments:  Manual cervical, suboccipital release, STM/ TPR/ MFR to Upper trap, levator, scalenes, rhomboids, supraspinatus and infraspinatus. Modalities:  none      Communication with other providers:  None       Assessment:     Pt responded well to manual treatments and therex. Pt shows forward shoulder posture due to tight pectorals, likely causing him pain in other movements. Pt stated doorway stretch makes him feel more relaxed. End pain: 1/10      Plan for Next Session:     Pt will continue to benefit from stretches and strengthening to improve posture and decrease pain.       Time In / Time Out:     1430 / 1510         Timed Code/Total Treatment Minutes:  36' / 36'    2 man    1 TE      Next Progress Note due:        Plan of Care Interventions:  [x] Therapeutic Exercise  [] Modalities:  [x] Therapeutic Activity     [] Ultrasound  [] Estim  [] Gait Training      [] Cervical Traction [] Lumbar Traction  [x] Neuromuscular Re-education    [] Cold/hotpack [] Iontophoresis   [x] Instruction in HEP      [] Vasopneumatic   [] Dry Needling    [x] Manual Therapy               [] Aquatic Therapy              Electronically signed by:  Brit Cantu PTA   6/21/2021, 2:32 PM Low Risk (score 7-11)